# Patient Record
(demographics unavailable — no encounter records)

---

## 2025-05-06 NOTE — DISCUSSION/SUMMARY
[Patient] : the patient [With Me] : with me [___ Month(s)] : in [unfilled] month(s) [FreeTextEntry1] : 66M w/ PMH as above presents to establish care with our practice.  Doing well and asymptomatic from a cardiovascular perspective.   1. HLD - continue rosuvastatin 5 mg PO QHS - excellent control.  RTC 6 months, he is to call with any symptoms.  [EKG obtained to assist in diagnosis and management of assessed problem(s)] : EKG obtained to assist in diagnosis and management of assessed problem(s)

## 2025-05-06 NOTE — HISTORY OF PRESENT ILLNESS
[FreeTextEntry1] : 66M w/ PMH of [place] on CPAP, BPH, HLD presenting to establish care with cardiology.  Doing well from a cardiac perspective - he walks 1.5 miles at a time commuting to work.  Otherwise, he'll play golf and walk the course without exertional symptoms.  Strictly compliant with CPAP.   5/6/2025: Feeling well from a cardiac perspective.  He denies chest pains, SOB and LE edema.

## 2025-05-20 NOTE — HEALTH RISK ASSESSMENT
[Yes] : Yes [2 - 3 times a week (3 pts)] : 2 - 3  times a week (3 points) [1 or 2 (0 pts)] : 1 or 2 (0 points) [Less than monthly (1 pt)] : Less than monthly (1 point) [No] : In the past 12 months have you used drugs other than those required for medical reasons? No [0] : 2) Feeling down, depressed, or hopeless: Not at all (0) [PHQ-2 Negative - No further assessment needed] : PHQ-2 Negative - No further assessment needed [Never] : Never [Patient reported colonoscopy was normal] : Patient reported colonoscopy was normal [Very Good] : ~his/her~ current health as very good [Excellent] : ~his/her~  mood as  excellent [BoneDensityComments] : has not had [ColonoscopyDate] : 12/2020

## 2025-05-20 NOTE — HISTORY OF PRESENT ILLNESS
[FreeTextEntry1] : Annual physical [de-identified] : Mr. Lui presents for an annual physical examination. Patient has a history of severe obstructive sleep apnea and is on APAP therapy.  He is compliant with its use. He also has a history of Singh's esophagus.  He had an EGD on 5/1/2025 which showed chronic gastritis.  H. pylori biopsies were negative.  The distal esophagus showed focal intestinal metaplasia with acute and chronic inflammation. Denies any chest pain, shortness of breath or palpitations.